# Patient Record
Sex: MALE | Race: AMERICAN INDIAN OR ALASKA NATIVE
[De-identification: names, ages, dates, MRNs, and addresses within clinical notes are randomized per-mention and may not be internally consistent; named-entity substitution may affect disease eponyms.]

---

## 2022-07-03 ENCOUNTER — HOSPITAL ENCOUNTER (EMERGENCY)
Dept: HOSPITAL 5 - ED | Age: 42
Discharge: LEFT BEFORE DISCHARGE | End: 2022-07-03
Payer: OTHER GOVERNMENT

## 2022-07-03 VITALS — DIASTOLIC BLOOD PRESSURE: 90 MMHG | SYSTOLIC BLOOD PRESSURE: 125 MMHG
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
## 2022-07-03 DIAGNOSIS — M54.2: ICD-10-CM
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
## 2022-07-03 DIAGNOSIS — Z53.21: ICD-10-CM

## 2022-07-03 DIAGNOSIS — M54.9: Primary | ICD-10-CM
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
## 2025-02-05 ENCOUNTER — OFFICE VISIT (OUTPATIENT)
Dept: URBAN - METROPOLITAN AREA CLINIC 118 | Facility: CLINIC | Age: 45
End: 2025-02-05

## 2025-02-06 ENCOUNTER — OFFICE VISIT (OUTPATIENT)
Dept: URBAN - METROPOLITAN AREA CLINIC 118 | Facility: CLINIC | Age: 45
End: 2025-02-06
Payer: SELF-PAY

## 2025-02-06 ENCOUNTER — LAB OUTSIDE AN ENCOUNTER (OUTPATIENT)
Dept: URBAN - METROPOLITAN AREA CLINIC 118 | Facility: CLINIC | Age: 45
End: 2025-02-06

## 2025-02-06 ENCOUNTER — DASHBOARD ENCOUNTERS (OUTPATIENT)
Age: 45
End: 2025-02-06

## 2025-02-06 VITALS — HEIGHT: 73 IN | DIASTOLIC BLOOD PRESSURE: 70 MMHG | HEART RATE: 101 BPM | SYSTOLIC BLOOD PRESSURE: 114 MMHG

## 2025-02-06 DIAGNOSIS — Z12.11 COLON CANCER SCREENING: ICD-10-CM

## 2025-02-06 DIAGNOSIS — R10.13 DYSPEPSIA: ICD-10-CM

## 2025-02-06 DIAGNOSIS — R14.3 EXCESSIVE GAS: ICD-10-CM

## 2025-02-06 PROCEDURE — 99204 OFFICE O/P NEW MOD 45 MIN: CPT | Performed by: INTERNAL MEDICINE

## 2025-02-06 PROCEDURE — 99203 OFFICE O/P NEW LOW 30 MIN: CPT | Performed by: INTERNAL MEDICINE

## 2025-02-06 NOTE — HPI-TODAY'S VISIT:
This is a 44 yo male with pmh of PTSD, TIA, DM, chronic fatigue syndrome here for evaluation for IBS. H/o PFO surgery.  Feels like it takes a long time for food to digest. Complains with abdominal bloating.  Symptoms initially started during 2020. Was diagnosed with IBS by PCP.  He tried to change diet. Avoided lactose in diet. Tried gas-x without relief. Complains of excessive gas.  He usually has a BM about 2-3 times a day. At times stools can be loose.  No nausea/vomiting, heartburn. Able to eat without much problem.  Hgb A1C went down.  No colonoscopy in the past.

## 2025-03-24 ENCOUNTER — OFFICE VISIT (OUTPATIENT)
Dept: URBAN - METROPOLITAN AREA CLINIC 117 | Facility: CLINIC | Age: 45
End: 2025-03-24

## 2025-03-25 ENCOUNTER — WEB ENCOUNTER (OUTPATIENT)
Dept: URBAN - METROPOLITAN AREA CLINIC 6 | Facility: CLINIC | Age: 45
End: 2025-03-25

## 2025-04-01 ENCOUNTER — OFFICE VISIT (OUTPATIENT)
Dept: URBAN - METROPOLITAN AREA SURGERY CENTER 23 | Facility: SURGERY CENTER | Age: 45
End: 2025-04-01